# Patient Record
Sex: MALE | Race: WHITE | HISPANIC OR LATINO | URBAN - METROPOLITAN AREA
[De-identification: names, ages, dates, MRNs, and addresses within clinical notes are randomized per-mention and may not be internally consistent; named-entity substitution may affect disease eponyms.]

---

## 2017-08-10 ENCOUNTER — EMERGENCY (EMERGENCY)
Facility: HOSPITAL | Age: 37
LOS: 1 days | End: 2017-08-10

## 2017-08-12 ENCOUNTER — TRANSCRIPTION ENCOUNTER (OUTPATIENT)
Age: 37
End: 2017-08-12

## 2021-04-07 ENCOUNTER — TRANSCRIPTION ENCOUNTER (OUTPATIENT)
Age: 41
End: 2021-04-07

## 2021-12-07 ENCOUNTER — OUTPATIENT (OUTPATIENT)
Dept: OUTPATIENT SERVICES | Facility: HOSPITAL | Age: 41
LOS: 1 days | End: 2021-12-07

## 2021-12-07 ENCOUNTER — INPATIENT (INPATIENT)
Facility: HOSPITAL | Age: 41
LOS: 0 days | Discharge: HOME | End: 2021-12-08
Payer: MEDICAID

## 2021-12-07 PROCEDURE — 71045 X-RAY EXAM CHEST 1 VIEW: CPT | Mod: 26

## 2021-12-07 PROCEDURE — 99285 EMERGENCY DEPT VISIT HI MDM: CPT

## 2021-12-07 PROCEDURE — 93458 L HRT ARTERY/VENTRICLE ANGIO: CPT | Mod: 26

## 2021-12-07 PROCEDURE — 93306 TTE W/DOPPLER COMPLETE: CPT | Mod: 26

## 2021-12-07 PROCEDURE — 93010 ELECTROCARDIOGRAM REPORT: CPT

## 2021-12-08 ENCOUNTER — OUTPATIENT (OUTPATIENT)
Dept: OUTPATIENT SERVICES | Facility: HOSPITAL | Age: 41
LOS: 1 days | End: 2021-12-08

## 2021-12-13 ENCOUNTER — APPOINTMENT (OUTPATIENT)
Dept: CARDIOLOGY | Facility: CLINIC | Age: 41
End: 2021-12-13
Payer: SELF-PAY

## 2021-12-13 ENCOUNTER — NON-APPOINTMENT (OUTPATIENT)
Age: 41
End: 2021-12-13

## 2021-12-13 VITALS
SYSTOLIC BLOOD PRESSURE: 122 MMHG | WEIGHT: 202 LBS | OXYGEN SATURATION: 98 % | HEIGHT: 68 IN | HEART RATE: 93 BPM | RESPIRATION RATE: 18 BRPM | TEMPERATURE: 98 F | BODY MASS INDEX: 30.62 KG/M2 | DIASTOLIC BLOOD PRESSURE: 76 MMHG

## 2021-12-13 VITALS — DIASTOLIC BLOOD PRESSURE: 76 MMHG | SYSTOLIC BLOOD PRESSURE: 118 MMHG

## 2021-12-13 DIAGNOSIS — Z78.9 OTHER SPECIFIED HEALTH STATUS: ICD-10-CM

## 2021-12-13 PROCEDURE — 93000 ELECTROCARDIOGRAM COMPLETE: CPT

## 2021-12-13 PROCEDURE — 99205 OFFICE O/P NEW HI 60 MIN: CPT

## 2021-12-13 RX ORDER — METOPROLOL SUCCINATE 25 MG/1
25 TABLET, EXTENDED RELEASE ORAL
Qty: 90 | Refills: 3 | Status: DISCONTINUED | COMMUNITY
End: 2021-12-13

## 2021-12-13 RX ORDER — KRILL/OM-3/DHA/EPA/PHOSPHO/AST 1000-230MG
81 CAPSULE ORAL DAILY
Refills: 0 | Status: DISCONTINUED | COMMUNITY
End: 2021-12-13

## 2021-12-13 NOTE — PHYSICAL EXAM
[Well Developed] : well developed [Well Nourished] : well nourished [No Acute Distress] : no acute distress [Normal Conjunctiva] : normal conjunctiva [Normal Venous Pressure] : normal venous pressure [No Carotid Bruit] : no carotid bruit [Normal S1, S2] : normal S1, S2 [No Rub] : no rub [No Gallop] : no gallop [Clear Lung Fields] : clear lung fields [Good Air Entry] : good air entry [No Respiratory Distress] : no respiratory distress  [Soft] : abdomen soft [Non Tender] : non-tender [Normal Gait] : normal gait [No Edema] : no edema [No Cyanosis] : no cyanosis [No Clubbing] : no clubbing [No Varicosities] : no varicosities [No Rash] : no rash [No Skin Lesions] : no skin lesions [Moves all extremities] : moves all extremities [No Focal Deficits] : no focal deficits [Normal Speech] : normal speech [Alert and Oriented] : alert and oriented [Normal memory] : normal memory

## 2021-12-13 NOTE — DISCUSSION/SUMMARY
[FreeTextEntry1] : \par 41-year-old male non-smoker, hyperlipidemia, prediabetes, hypertension, Covid infection March 2021 presents after hospitalization for type II NSTEMI 12/2021.  This occurred after heavy lifting.\par \par Coronary angiogram without significant coronary disease, mid LAD myocardial bridge, normal EDP.  Echocardiogram without structural abnormality\par \par # HTN, HLD/hypertrig, prediabetes:\par -Switched to amlodipine 10, maintain blood pressure log\par -Continue atorvastatin 20.  Recheck in 3 months\par -Diet/lifestyle optimization.  Decrease red meat intake which she has done over the past week.  Mediterranean diet\par \par # Mid LAD bridge:\par -Switch metoprolol to amlodipine 10 given exacerbation of gout with the beta-blocker\par \par # Gout: naproxen for 4 days (off aspirin). decrease red meat. pcp consultation. \par \par \par Return in 6 months.  Lab work in 3 months.  ER precautions given to patient.

## 2021-12-13 NOTE — HISTORY OF PRESENT ILLNESS
[FreeTextEntry1] : \par 41-year-old male non-smoker, hyperlipidemia, prediabetes, hypertension, Covid infection March 2021 presents after hospitalization for type II NSTEMI 12/2021.  This occurred after heavy lifting.\par \par Coronary angiogram without significant coronary disease, mid LAD myocardial bridge, normal EDP.  Echocardiogram without structural abnormality\par \par Was placed on beta-blocker and statin.  Beta-blocker seems to have caused gout with left toe pain.\par \par Right radial artery Access site without significant pain/tenderness, redness, swelling, bruising, full ROM, no bruit upon auscultation, and patent distal pulses.\par \par \par TESTING:\par 12/2021\par EKG NSR\par Coronary angiogram no CAD, mid LAD bridge, normal EDP\par Labwork: .  A1c 5.8.\par Echocardiogram preserved biventricular function with no significant valvular heart disease

## 2022-06-27 ENCOUNTER — APPOINTMENT (OUTPATIENT)
Dept: CARDIOLOGY | Facility: CLINIC | Age: 42
End: 2022-06-27

## 2024-02-11 ENCOUNTER — RESULT REVIEW (OUTPATIENT)
Age: 44
End: 2024-02-11

## 2024-02-13 ENCOUNTER — TRANSCRIPTION ENCOUNTER (OUTPATIENT)
Age: 44
End: 2024-02-13

## 2024-02-20 ENCOUNTER — APPOINTMENT (OUTPATIENT)
Dept: CARDIOLOGY | Facility: CLINIC | Age: 44
End: 2024-02-20
Payer: SELF-PAY

## 2024-02-20 VITALS
BODY MASS INDEX: 29.7 KG/M2 | HEART RATE: 77 BPM | DIASTOLIC BLOOD PRESSURE: 82 MMHG | WEIGHT: 196 LBS | HEIGHT: 68 IN | OXYGEN SATURATION: 100 % | SYSTOLIC BLOOD PRESSURE: 118 MMHG

## 2024-02-20 DIAGNOSIS — Q24.5 MALFORMATION OF CORONARY VESSELS: ICD-10-CM

## 2024-02-20 DIAGNOSIS — R73.03 PREDIABETES.: ICD-10-CM

## 2024-02-20 DIAGNOSIS — Z00.00 ENCOUNTER FOR GENERAL ADULT MEDICAL EXAMINATION W/OUT ABNORMAL FINDINGS: ICD-10-CM

## 2024-02-20 DIAGNOSIS — E78.00 PURE HYPERCHOLESTEROLEMIA, UNSPECIFIED: ICD-10-CM

## 2024-02-20 PROCEDURE — 99215 OFFICE O/P EST HI 40 MIN: CPT

## 2024-02-20 RX ORDER — METOPROLOL SUCCINATE 25 MG/1
25 TABLET, EXTENDED RELEASE ORAL
Refills: 0 | Status: ACTIVE | COMMUNITY

## 2024-02-20 RX ORDER — CLOPIDOGREL BISULFATE 75 MG/1
75 TABLET, FILM COATED ORAL
Refills: 0 | Status: ACTIVE | COMMUNITY

## 2024-02-20 RX ORDER — AMLODIPINE BESYLATE 10 MG/1
10 TABLET ORAL DAILY
Qty: 90 | Refills: 3 | Status: DISCONTINUED | COMMUNITY
Start: 2021-12-13 | End: 2024-02-20

## 2024-02-20 RX ORDER — ASPIRIN 81 MG/1
81 TABLET, CHEWABLE ORAL
Refills: 0 | Status: ACTIVE | COMMUNITY

## 2024-02-20 RX ORDER — NAPROXEN 500 MG/1
500 TABLET ORAL TWICE DAILY
Qty: 8 | Refills: 0 | Status: DISCONTINUED | COMMUNITY
Start: 2021-12-13 | End: 2024-02-20

## 2024-02-20 RX ORDER — ATORVASTATIN CALCIUM 40 MG/1
40 TABLET, FILM COATED ORAL
Refills: 0 | Status: ACTIVE | COMMUNITY

## 2024-02-20 RX ORDER — ATORVASTATIN CALCIUM 20 MG/1
20 TABLET, FILM COATED ORAL
Qty: 90 | Refills: 1 | Status: DISCONTINUED | COMMUNITY
End: 2024-02-20

## 2024-02-20 NOTE — HISTORY OF PRESENT ILLNESS
[FreeTextEntry1] : he has no chest pain He has no shortness of breath He has no palpitations He has no syncope He is neurologically intact He has no edema He has no GI symptoms

## 2024-02-20 NOTE — REASON FOR VISIT
[Symptom and Test Evaluation] : symptom and test evaluation [Hyperlipidemia] : hyperlipidemia [Hypertension] : hypertension [Coronary Artery Disease] : coronary artery disease [FreeTextEntry1] : 43-year-old male non-smoker, hyperlipidemia, prediabetes, hypertension, Covid infection 2021 presents to the hospitalization for type II NSTEMI 24 Coronary angiogram without significant coronary disease, mid LAD myocardial bridge, normal EDP.  Echocardiogram without structural abnormality  Was placed on beta-blocker and statin.  Right radial artery Access site without significant pain/tenderness, redness, swelling, bruising, full ROM, no bruit upon auscultation, and patent distal pulses.   [id number] EKG NSR Coronary angiogram no CAD, mid LAD bridge, normal EDP Labwork: .  A1c 5.8. Echocardiogram preserved biventricular function with no significant valvular heart disease

## 2024-02-20 NOTE — DISCUSSION/SUMMARY
[FreeTextEntry1] : 41-year-old male non-smoker, hyperlipidemia, prediabetes, hypertension, Covid infection March 2021 presents after hospitalization for type II NSTEMI 02/12/24  Coronary angiogram without significant coronary disease, mid LAD myocardial bridge, normal EDP.  Echocardiogram without structural abnormality  # HTN, HLD/hypertrig, prediabetes: -Switched to amlodipine 10, maintain blood pressure log -Continue atorvastatin 20.  Recheck in 3 months -Diet/lifestyle optimization.  Decrease red meat intake which she has done over the past week.  Mediterranean diet  # Mid LAD bridge: -Switch metoprolol to amlodipine 10 given exacerbation of gout with the beta-blocker  # Gout: naproxen for 4 days (off aspirin). decrease red meat. pcp consultation.    Return in 6 months.  Lab work in 3 months.  ER precautions given to patient.

## 2024-07-09 ENCOUNTER — APPOINTMENT (OUTPATIENT)
Dept: CARDIOLOGY | Facility: CLINIC | Age: 44
End: 2024-07-09

## 2025-02-27 ENCOUNTER — NON-APPOINTMENT (OUTPATIENT)
Age: 45
End: 2025-02-27